# Patient Record
Sex: FEMALE | Race: WHITE | Employment: FULL TIME | URBAN - METROPOLITAN AREA
[De-identification: names, ages, dates, MRNs, and addresses within clinical notes are randomized per-mention and may not be internally consistent; named-entity substitution may affect disease eponyms.]

---

## 2023-01-08 ENCOUNTER — OFFICE VISIT (OUTPATIENT)
Dept: URGENT CARE | Facility: CLINIC | Age: 33
End: 2023-01-08

## 2023-01-08 VITALS
DIASTOLIC BLOOD PRESSURE: 78 MMHG | SYSTOLIC BLOOD PRESSURE: 144 MMHG | HEART RATE: 67 BPM | OXYGEN SATURATION: 99 % | RESPIRATION RATE: 16 BRPM | TEMPERATURE: 98.3 F

## 2023-01-08 DIAGNOSIS — J01.40 ACUTE PANSINUSITIS, RECURRENCE NOT SPECIFIED: Primary | ICD-10-CM

## 2023-01-08 RX ORDER — SUMATRIPTAN 50 MG/1
TABLET, FILM COATED ORAL EVERY 24 HOURS
COMMUNITY

## 2023-01-08 RX ORDER — CITALOPRAM 20 MG/1
20 TABLET ORAL DAILY
COMMUNITY

## 2023-01-08 RX ORDER — PROCHLORPERAZINE 25 MG
SUPPOSITORY, RECTAL RECTAL
COMMUNITY

## 2023-01-08 RX ORDER — BUSPIRONE HYDROCHLORIDE 15 MG/1
15 TABLET ORAL 2 TIMES DAILY
COMMUNITY
Start: 2023-01-03

## 2023-01-08 RX ORDER — EPINEPHRINE 0.3 MG/.3ML
INJECTION SUBCUTANEOUS
COMMUNITY

## 2023-01-08 RX ORDER — HYDROXYZINE HYDROCHLORIDE 10 MG/1
TABLET, FILM COATED ORAL
COMMUNITY
Start: 2022-12-05

## 2023-01-08 RX ORDER — INDOMETHACIN 50 MG/1
CAPSULE ORAL
COMMUNITY

## 2023-01-08 RX ORDER — VITAMIN E (DL,TOCOPHERYL ACET) 180 MG
CAPSULE ORAL EVERY 24 HOURS
COMMUNITY

## 2023-01-08 RX ORDER — AMOXICILLIN AND CLAVULANATE POTASSIUM 875; 125 MG/1; MG/1
1 TABLET, FILM COATED ORAL EVERY 12 HOURS SCHEDULED
Qty: 14 TABLET | Refills: 0 | Status: SHIPPED | OUTPATIENT
Start: 2023-01-08 | End: 2023-01-15

## 2023-01-09 NOTE — PATIENT INSTRUCTIONS
Sinusitis   AMBULATORY CARE:   Sinusitis  is inflammation or infection of your sinuses  Sinusitis is most often caused by a virus  Acute sinusitis may last up to 12 weeks  Chronic sinusitis lasts longer than 12 weeks  Recurrent sinusitis means you have 4 or more infections in 1 year  Common signs and symptoms:   Fever    Pain, pressure, redness, or swelling around the forehead, cheeks, or eyes    Thick yellow or green discharge from your nose    Tenderness when you touch your face over your sinuses    Dry cough that happens mostly at night or when you lie down    Headache and face pain that is worse when you lean forward    Tooth pain, or pain when you chew    Seek care immediately if:   You have trouble breathing or wheezing that is getting worse  You have a stiff neck, a fever, or a bad headache  You cannot open your eye  Your eyeball bulges out or you cannot move your eye  You are more sleepy than normal, or you notice changes in your ability to think, move, or talk  You have swelling of your forehead or scalp  Call your doctor if:   You have vision changes, such as double vision  Your eye and eyelid are red, swollen, and painful  Your symptoms do not improve or go away after 10 days  You have nausea and are vomiting  Your nose is bleeding  You have questions or concerns about your condition or care  Medicines: Your symptoms may go away on their own  Your healthcare provider may recommend watchful waiting for up to 10 days before starting antibiotics  You may need any of the following:  Acetaminophen  decreases pain and fever  It is available without a doctor's order  Ask how much to take and how often to take it  Follow directions  Read the labels of all other medicines you are using to see if they also contain acetaminophen, or ask your doctor or pharmacist  Acetaminophen can cause liver damage if not taken correctly   Do not use more than 4 grams (4,000 milligrams) total of acetaminophen in one day  NSAIDs , such as ibuprofen, help decrease swelling, pain, and fever  This medicine is available with or without a doctor's order  NSAIDs can cause stomach bleeding or kidney problems in certain people  If you take blood thinner medicine, always ask your healthcare provider if NSAIDs are safe for you  Always read the medicine label and follow directions  Nasal steroid sprays  may help decrease inflammation in your nose and sinuses  Decongestants  help reduce swelling and drain mucus in the nose and sinuses  They may help you breathe easier  Antihistamines  help dry mucus in the nose and relieve sneezing  Antibiotics  help treat or prevent a bacterial infection  Self-care:   Rinse your sinuses as directed  Use a sinus rinse device to rinse your nasal passages with a saline (salt water) solution or distilled water  Do not use tap water  This will help thin the mucus in your nose and rinse away pollen and dirt  It will also help reduce swelling so you can breathe normally  Use a humidifier  to increase air moisture in your home  This may make it easier for you to breathe and help decrease your cough  Sleep with your head elevated  Place an extra pillow under your head before you go to sleep to help your sinuses drain  Drink liquids as directed  Ask your healthcare provider how much liquid to drink each day and which liquids are best for you  Liquids will thin the mucus in your nose and help it drain  Avoid drinks that contain alcohol or caffeine  Do not smoke, and avoid secondhand smoke  Nicotine and other chemicals in cigarettes and cigars can make your symptoms worse  Ask your healthcare provider for information if you currently smoke and need help to quit  E-cigarettes or smokeless tobacco still contain nicotine  Talk to your healthcare provider before you use these products      Prevent the spread of germs:   Wash your hands often with soap and water  Wash your hands after you use the bathroom, change a child's diaper, or sneeze  Wash your hands before you prepare or eat food  Stay away from people who are sick  Some germs spread easily and quickly through contact  Follow up with your doctor as directed: You may be referred to an ear, nose, and throat specialist  Write down your questions so you remember to ask them during your visits  © Copyright [employer] 2022 Information is for End User's use only and may not be sold, redistributed or otherwise used for commercial purposes  All illustrations and images included in CareNotes® are the copyrighted property of A D A M , Inc  or [facility] Saavedra   The above information is an  only  It is not intended as medical advice for individual conditions or treatments  Talk to your doctor, nurse or pharmacist before following any medical regimen to see if it is safe and effective for you

## 2023-02-24 NOTE — PROGRESS NOTES
[facility]        NAME: Keshawn Hyman is a 27 y o  female  : 1990    MRN: [id number]  DATE: 2023  TIME: 3:03 PM    Assessment and Plan   Acute pansinusitis, recurrence not specified [J01 40]  1  Acute pansinusitis, recurrence not specified  amoxicillin-clavulanate (AUGMENTIN) 875-125 mg per tablet            Patient Instructions       Follow up with PCP in 3-5 days  Proceed to  ER if symptoms worsen  Chief Complaint     Chief Complaint   Patient presents with   • Cold Like Symptoms     1 week:  Cough, sore throat, HA, post nasal drip, nasal congestion, sinus pressure, bilateral ear pain  Denies fever, chills, n/v/d  Pt covid tested several times at home  (negative)  Pt taking mucinex and theraflu otc  History of Present Illness       28 yo F presents with Cough, sore throat, HA, post nasal drip, nasal congestion, sinus pressure, bilateral ear pain  Denies fever, chills, n/v/d  Pt covid tested several times at home  (negative)  Pt taking mucinex and theraflu otc  Symptoms started 1 week ago  Review of Systems   Review of Systems   Constitutional: Negative for chills and fever  HENT: Positive for congestion, ear pain, sinus pressure and sore throat  Negative for rhinorrhea and sinus pain  Respiratory: Positive for cough  Gastrointestinal: Negative for abdominal pain, diarrhea, nausea and vomiting  Musculoskeletal: Negative for myalgias  Neurological: Positive for headaches  Negative for dizziness and light-headedness           Current Medications       Current Outpatient Medications:   •  busPIRone (BUSPAR) 15 mg tablet, Take 15 mg by mouth 2 (two) times a day, Disp: , Rfl:   •  Cholecalciferol 50 MCG ( UT) TABS, 1 capsule every 24 hours, Disp: , Rfl:   •  citalopram (CeleXA) 20 mg tablet, Take 20 mg by mouth daily, Disp: , Rfl:   •  EPINEPHrine (EpiPen 2-Binu) 0 3 mg/0 3 mL SOAJ, as directed Injection as directed, Disp: , Rfl:   •  hydrOXYzine HCL (ATARAX) 10 mg tablet, TAKE 1/4 TABLET 1-4 TIMES A DAY, Disp: , Rfl:   •  indomethacin (INDOCIN) 50 mg capsule, TAKE 1 CAPSULE EVERY DAY AS NEEDED WITH FOOD OR MILK for 30, Disp: , Rfl:   •  Magnesium Oxide 500 MG CAPS, every 24 hours, Disp: , Rfl:   •  Multiple Vitamin (MULTI-VITAMIN DAILY PO), Take by mouth daily, Disp: , Rfl:   •  Probiotic Product (PROBIOTIC-10 PO), every 24 hours, Disp: , Rfl:   •  prochlorperazine (COMPAZINE) 25 mg suppository, 1 suppository as needed Rectal Twice a day prn nausea/vomiting, Disp: , Rfl:   •  SUMAtriptan (IMITREX) 50 mg tablet, every 24 hours, Disp: , Rfl:     Current Allergies     Allergies as of 01/08/2023 - Reviewed 01/08/2023   Allergen Reaction Noted   • 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   • Camphor Other (See Comments) 12/23/2015   • Mirabegron Hypertension 01/08/2023   • Doxycycline Rash 12/23/2015   • Latex Rash 12/23/2015            The following portions of the patient's history were reviewed and updated as appropriate: allergies, current medications, past family history, past medical history, past social history, past surgical history and problem list      History reviewed  No pertinent past medical history  History reviewed  No pertinent surgical history  History reviewed  No pertinent family history  Medications have been verified  Objective   /78   Pulse 67   Temp 98 3 °F (36 8 °C)   Resp 16   SpO2 99%   No LMP recorded  Physical Exam     Physical Exam  Vitals and nursing note reviewed  Constitutional:       General: She is not in acute distress  Appearance: She is well-developed  She is not diaphoretic  HENT:      Head: Normocephalic and atraumatic  Right Ear: Tympanic membrane normal       Left Ear: Tympanic membrane normal       Nose: Mucosal edema, congestion and rhinorrhea present  Right Sinus: Maxillary sinus tenderness and frontal sinus tenderness present        Left Sinus: Maxillary sinus tenderness and frontal sinus tenderness present  Mouth/Throat:      Pharynx: Uvula midline  Posterior oropharyngeal erythema (PND) present  Cardiovascular:      Rate and Rhythm: Normal rate and regular rhythm  Pulmonary:      Effort: Pulmonary effort is normal       Breath sounds: Normal breath sounds  Musculoskeletal:         General: Normal range of motion  Skin:     General: Skin is warm and dry  Findings: No rash  Neurological:      Mental Status: She is alert and oriented to person, place, and time

## 2023-05-05 ENCOUNTER — OFFICE VISIT (OUTPATIENT)
Dept: PHYSICAL THERAPY | Facility: CLINIC | Age: 33
End: 2023-05-05

## 2023-05-05 DIAGNOSIS — M76.71 PERONEAL TENDONITIS OF RIGHT LOWER LEG: Primary | ICD-10-CM

## 2023-05-05 NOTE — PROGRESS NOTES
PT Evaluation     Today's date: 2023  Patient name: Tania Marc  : 1990  MRN: [id number]  Referring provider: Yolanda Mulligan PT  Dx:   Encounter Diagnosis     ICD-10-CM    1  Peroneal tendonitis of right lower leg  M76 71                      Assessment  Assessment details: Tania Marc is a 27 y o  female presenting to outpatient physical therapy at [place] [address] with complaints of R medial ankle and foot pain  She presents with decreased intrinsic foot strength, limited STM, altered running mechanics, poor balance, decreased tolerance to activity and decreased functional mobility due to R ankle posterior tibialis tendonitis  She would benefit from skilled PT services in order to address these deficits and reach maximum level of function  She will benefit form a running analysis  She presented to me via direct access  Impairments: abnormal gait, abnormal or restricted ROM, activity intolerance, impaired balance, impaired physical strength, lacks appropriate home exercise program and pain with function  Barriers to therapy: None  Understanding of Dx/Px/POC: excellent  Goals  ST  Independent with HEP in 2 weeks  2  No tightness R posterior tib in 3 weeks     LT  Achieve FOTO score of 72/100 in 4 weeks   2  Able to run x 3 miles without LE pain in 4 weeks  3  Strength B foot intrinsics = 5/5 in 4 weeks  4    Good running mechanics in 4 weeks    Plan  Patient would benefit from: skilled PT and PT eval  Planned modality interventions: cryotherapy  Other planned modality interventions: laser  Planned therapy interventions: ADL retraining, balance/weight bearing training, flexibility, functional ROM exercises, home exercise program, joint mobilization, manual therapy, neuromuscular re-education, strengthening, stretching, therapeutic activities, therapeutic exercise and gait training  Frequency: 2x week  Duration in weeks: 4  Plan of Care beginning date: 2023  Plan of Care expiration date: 2023  Treatment plan discussed with: patient        Subjective Evaluation    History of Present Illness  Mechanism of injury: Pt reports having R>L ankle pain since about 1 year ago  Had PT with min pain relief  Working with a running  - strength training 3x/wk  Running 3x/wk about 20 miles/week  Has been running since 15 years mostly road  Wants to run a 50 mile race in 2023  Has completed 3 marathons  She is a runner and is having difficulty running due to her pain  Has not run in the past week due to pain  Uses orthotics in B shoes  Working full time as a dietician  Recurrent probem    Quality of life: excellent    Pain  Current pain ratin  At best pain ratin  At worst pain ratin  Quality: sharp  Relieving factors: rest  Aggravating factors: running  Progression: worsening    Social Support  Lives with: spouse    Employment status: working    Diagnostic Tests  No diagnostic tests performed  Treatments  Previous treatment: physical therapy  Patient Goals  Patient goals for therapy: decreased pain, improved balance, increased strength and return to sport/leisure activities          Objective     Observations   Left Ankle/Foot   Negative for effusion  Right Ankle/Foot   Negative for effusion  Additional Observation Details  Mod pes planus B  Palpation   Left   No palpable tenderness to the posterior tibialis  Right   Hypertonic in the posterior tibialis  Tenderness of the posterior tibialis  Tenderness     Right Ankle/Foot   Tenderness in the posterior tibial tendon  No tenderness in the peroneal tendon and plantar fascia       Neurological Testing     Sensation     Ankle/Foot   Left Ankle/Foot   Intact: light touch    Right Ankle/Foot   Intact: light touch     Active Range of Motion   Left Ankle/Foot   Normal active range of motion    Right Ankle/Foot   Normal active range of motion    Strength/Myotome Testing     Left Ankle/Foot   Normal strength    Right Ankle/Foot   Normal strength    Additional Strength Details  Foot intrinsics = 4-/5 B    Ambulation     Observational Gait   Gait: within functional limits     Comments   Fair B LE balance         Flowsheet Rows    Flowsheet Row Most Recent Value   PT/OT G-Codes    Current Score 65   Projected Score 78        POC EXPIRES On:  6/2/23  PRECAUTIONS:  None  CO-MORBIDITES:  None  PERSONAL FACTORS:  None      Manuals HEP 5/5           STM R post tib  10'                                                  Neuro Re-Ed                                                                                                Ther Ex    Toe flexion 5/5 20           Toe spreading 5/5 20                                                                                         Ther Activity                              Gait Training                              Modalities

## 2023-05-12 ENCOUNTER — OFFICE VISIT (OUTPATIENT)
Dept: PHYSICAL THERAPY | Facility: CLINIC | Age: 33
End: 2023-05-12

## 2023-05-12 DIAGNOSIS — M76.71 PERONEAL TENDONITIS OF RIGHT LOWER LEG: Primary | ICD-10-CM

## 2023-05-12 NOTE — PROGRESS NOTES
Daily Note     Today's date: 2023  Patient name: Simeon Duran  : 1990  MRN: [id number]  Referring provider: Ricki Good, PT  Dx:   Encounter Diagnosis     ICD-10-CM    1  Peroneal tendonitis of right lower leg  M76 71                  Subjective:  Pt reports feeling pretty good  Able to run up to 4 miles and feeling good when running but min soreness after  Objective:  See treatment diary below      Assessment:  Pt presented to outpatient physical therapy at [place] [address] with complaints of R medial ankle and foot pain  She presented with decreased intrinsic foot strength, limited STM, altered running mechanics, poor balance, decreased tolerance to activity and decreased functional mobility due to R ankle posterior tibialis tendonitis  Much less R ankle pain today, but min L medial ankle pain as well as she has increased her running mileage  Able to run up to 4 miles now  She should be able to run 5 miles on 23 with 2 4 mile runs next week with up to 10k run on 23 if all progresses well  She will continue to benefit from skilled PT services in order to address these deficits and reach maximum level of function  She will benefit form a running analysis  She presented to me via direct access  Plan:  Running eval next session  Last scheduled visit on 23        POC EXPIRES On:  23  PRECAUTIONS:  None  CO-MORBIDITES:  None  PERSONAL FACTORS:  None      Manuals HEP           STM L/R post tib  10' 8'          Laser L/R post tib   5'                                    Neuro Re-Ed     Bosu mini squats dome down   20          Bosu lunges dome up FWD + Side L/R   20 ea          TB kicks with L for R LE balance hip flex, ext, abd   Green 20 ea          SLS R on bkack disc with fingertip cone touch FWD, L/R side   10 ea                                                 Ther Ex    Toe flexion  20 20          Toe spreading  20 20          Elliptical   L1 5' Ther Activity                              Gait Training                              Modalities

## 2023-05-19 ENCOUNTER — OFFICE VISIT (OUTPATIENT)
Dept: PHYSICAL THERAPY | Facility: CLINIC | Age: 33
End: 2023-05-19

## 2023-05-19 DIAGNOSIS — M76.71 PERONEAL TENDONITIS OF RIGHT LOWER LEG: Primary | ICD-10-CM

## 2023-05-19 NOTE — PROGRESS NOTES
Daily Note     Today's date: 2023  Patient name: Bridgette Hankins  : 1990  MRN: [id number]  Referring provider: Sky Curry, PT  Dx:   Encounter Diagnosis     ICD-10-CM    1  Peroneal tendonitis of right lower leg  M76 71                  Subjective:  Pt reports feeling good aside from some L lateral ankle soreness after rolling her ankle 2 days ago  Not bad but not sure a running analysis would be good today  Able to run 5 miles without pain now  Objective:  See treatment diary below      Assessment:  Pt presented to outpatient physical therapy at [facility] with complaints of R medial ankle and foot pain  She presented with decreased intrinsic foot strength, limited STM, altered running mechanics, poor balance, decreased tolerance to activity and decreased functional mobility due to R ankle posterior tibialis tendonitis  Much less R ankle pain now, but min L lateral ankle pain after sustaining an inversion sprain 2 days ago  Able to run up to 5 miles now  She will continue to benefit from skilled PT services in order to address these deficits and reach maximum level of function  She will benefit form a running analysis  She presented to me via direct access  Plan:  Running eval next session as L lateral ankle soreness lessens          POC EXPIRES On:  23  PRECAUTIONS:  None  CO-MORBIDITES:  None  PERSONAL FACTORS:  None      Manuals HEP          STM L/R post tib  10' 8' 8'         Laser L/R post tib   5' 5'                                   Neuro Re-Ed     Bosu mini squats dome down   20 20         Bosu lunges dome up FWD + Side L/R   20 ea 20 ea         TB kicks with L for R LE balance hip flex, ext, abd   Green 20 ea Green 20 ea L+R         SLS R on bkack disc with fingertip cone touch FWD, L/R side   10 ea 10 ea                                                Ther Ex    Toe flexion 5/5 20 20 20         Toe spreading 5/ 20 20 20         Elliptical   L1 5' L1 5' Ther Activity                              Gait Training                              Modalities

## 2023-06-07 ENCOUNTER — OFFICE VISIT (OUTPATIENT)
Dept: PHYSICAL THERAPY | Facility: CLINIC | Age: 33
End: 2023-06-07
Payer: COMMERCIAL

## 2023-06-07 DIAGNOSIS — M76.71 PERONEAL TENDONITIS OF RIGHT LOWER LEG: Primary | ICD-10-CM

## 2023-06-07 PROCEDURE — 97530 THERAPEUTIC ACTIVITIES: CPT | Performed by: PHYSICAL THERAPIST

## 2023-06-07 PROCEDURE — 97140 MANUAL THERAPY 1/> REGIONS: CPT | Performed by: PHYSICAL THERAPIST

## 2023-06-07 NOTE — PROGRESS NOTES
PT Re-evaluation + D/C    Today's date: 2023  Patient name: Jovita Drake  : 1990  MRN: [id number]  Referring provider: Ira Mckenzie PT  Dx:   Encounter Diagnosis     ICD-10-CM    1  Peroneal tendonitis of right lower leg  M76 71                      Assessment  Assessment details: Jovita Drake is a 27 y o  female who presented to outpatient physical therapy at [place] [address] with complaints of R medial ankle and foot pain  She presented with decreased intrinsic foot strength, limited STM, altered running mechanics, poor balance, decreased tolerance to activity and decreased functional mobility due to R ankle posterior tibialis tendonitis  Her progression has bene good in PT  She will continue to benefit from skilled PT services in order to address these deficits and reach maximum level of function  Video analysis today showed very good form aside from min slowed ashli  She was advised to try to pace count about 1x every mile for 30 sec to try to get her step rate up to 180/min  She was able to achieve this on the t-mill today after cueing  No significant pain or tenderness remaining in LE's  She presented to me initially via direct access  She is ready for D/C to HEP at this time  Impairments: abnormal gait, abnormal or restricted ROM, activity intolerance, impaired physical strength and pain with function  Barriers to therapy: None  Understanding of Dx/Px/POC: excellent  Goals  ST  Independent with HEP in 2 weeks - Met  2  No tightness R posterior tib in 3 weeks - Met     LT  Achieve FOTO score of 72/100 in 4 weeks - Met  2  Able to run x 3 miles without LE pain in 4 weeks - Met  3  Strength B foot intrinsics = 5/5 in 4 weeks - Met  4    Good running mechanics in 4 weeks - Met    Plan  Patient would benefit from: skilled PT  Planned modality interventions: cryotherapy  Other planned modality interventions: laser  Planned therapy interventions: ADL retraining, balance/weight bearing training, flexibility, functional ROM exercises, home exercise program, joint mobilization, manual therapy, neuromuscular re-education, strengthening, stretching, therapeutic activities, therapeutic exercise and gait training  Frequency: 1x week  Duration in weeks: 1  Treatment plan discussed with: patient        Subjective Evaluation    History of Present Illness  Mechanism of injury: Pt reports having R>L ankle pain since about 1 year ago  Had PT with min pain relief  Working with a running  - strength training 3x/wk  Running 3x/wk about 20 miles/week  Has been running since 15 years mostly road  Was able to run 7 miles on 23 without any pain  Wants to run a 50 mile race in 2023  Has completed 3 marathons  She is a runner and was having difficulty running due to her pain, but less recently since starting PT  Uses orthotics in B shoes  Working full time as a dietician  Recurrent probem    Quality of life: excellent    Pain  Current pain ratin  At best pain ratin  At worst pain ratin  Quality: dull ache  Relieving factors: rest  Aggravating factors: running  Progression: improved    Social Support  Lives with: spouse    Employment status: working    Diagnostic Tests  No diagnostic tests performed  Treatments  Previous treatment: physical therapy  Current treatment: chiropractic and physical therapy  Patient Goals  Patient goals for therapy: decreased pain, increased strength and return to sport/leisure activities          Objective     Observations   Left Ankle/Foot   Negative for effusion  Right Ankle/Foot   Negative for effusion  Additional Observation Details  Min pes planus B  Palpation   Left   No palpable tenderness to the posterior tibialis  Right   No palpable tenderness to the posterior tibialis  Tenderness     Right Ankle/Foot   No tenderness in the peroneal tendon, plantar fascia and posterior tibial tendon       Neurological Testing Sensation     Ankle/Foot   Left Ankle/Foot   Intact: light touch    Right Ankle/Foot   Intact: light touch     Active Range of Motion   Left Ankle/Foot   Normal active range of motion    Right Ankle/Foot   Normal active range of motion    Strength/Myotome Testing     Left Ankle/Foot   Normal strength    Right Ankle/Foot   Normal strength    Additional Strength Details  Foot intrinsics = 4+/5 B    Ambulation     Observational Gait   Gait: within functional limits     Comments   Excellent B LE balance            POC EXPIRES On:  6/7/23  PRECAUTIONS:  None  CO-MORBIDITES:  None  PERSONAL FACTORS:  None      Manuals HEP 5/5 5/12 5/19 6/7        STM L/R post tib  10' 8' 8'         Laser L/R post tib   5' 5' 8'                                  Neuro Re-Ed     Bosu mini squats dome down   20 20         Bosu lunges dome up FWD + Side L/R   20 ea 20 ea         TB kicks with L for R LE balance hip flex, ext, abd   Green 20 ea Green 20 ea L+R         SLS R on bkack disc with fingertip cone touch FWD, L/R side   10 ea 10 ea                                                Ther Ex    Toe flexion 5/5 20 20 20         Toe spreading 5/5 20 20 20         Elliptical   L1 5' L1 5'                                                                          Ther Activity    Running analysis     23'                     Gait Training                              Modalities

## 2023-06-09 ENCOUNTER — APPOINTMENT (OUTPATIENT)
Dept: PHYSICAL THERAPY | Facility: CLINIC | Age: 33
End: 2023-06-09
Payer: COMMERCIAL

## 2025-07-25 ENCOUNTER — OFFICE VISIT (OUTPATIENT)
Dept: PODIATRY | Facility: CLINIC | Age: 35
End: 2025-07-25
Payer: COMMERCIAL

## 2025-07-25 DIAGNOSIS — L60.0 INGROWN TOENAIL: Primary | ICD-10-CM

## 2025-07-25 DIAGNOSIS — M79.674 PAIN OF RIGHT GREAT TOE: ICD-10-CM

## 2025-07-25 PROCEDURE — 99203 OFFICE O/P NEW LOW 30 MIN: CPT | Performed by: PODIATRIST

## 2025-07-25 PROCEDURE — 11750 EXCISION NAIL&NAIL MATRIX: CPT | Performed by: PODIATRIST

## 2025-07-25 NOTE — PROGRESS NOTES
"Name: Sophia Elizabeth      : 1990      MRN: [id number]  Encounter Provider: Mayo Paris DPM  Encounter Date: 2025   Encounter department: [facility] [place]  :  Assessment & Plan  Ingrown toenail         Pain of right great toe       Examined the left hallux nail which was surgically removed approximately 4 days ago.  She is to follow the same postop course with this nail as she does with the nail that was done today.  Nail removal    Date/Time: 2025 9:00 AM    Performed by: Mayo Paris DPM  Authorized by: Mayo Paris DPM    Patient location:  Clinic  Indications / Diagnosis:  Ingrown nail    Universal Protocol:  procedure performed by consultantConsent: Verbal consent obtained  Risks and benefits: risks, benefits and alternatives were discussed  Consent given by: patient  Time out: Immediately prior to procedure a \"time out\" was called to verify the correct patient, procedure, equipment, support staff and site/side marked as required.  Patient understanding: patient states understanding of the procedure being performed  Patient consent: the patient's understanding of the procedure matches consent given  Patient identity confirmed: verbally with patient    Location:     Foot:  R big toe  Pre-procedure details:     Skin preparation:  Betadine    Preparation: Patient was prepped and draped in the usual sterile fashion    Anesthesia (see MAR for exact dosages):     Anesthesia method:  Nerve block    Block needle gauge:  25 G    Block anesthetic:  Lidocaine 2% w/o epi    Block technique:  Digital Block    Block outcome:  Anesthesia achieved  Nail Removal:     Nail removed:  Partial    Nail removed location: medial and lateral borders.    Nail bed sutured: no    Ingrown nail:     Wedge excision of skin: no      Nail matrix removed or ablated:  Partial  Post-procedure details:     Dressing:  4x4 sterile gauze, antibiotic ointment and gauze roll    Patient tolerance of " procedure:  Tolerated well, no immediate complications  Comments:      Discussion with patient was completed today regarding diagnosis and potential etiologies as well as treatment options for this ingrown nail diagnosis.  Discussed how to avoid recurrence and possible treatment options if recurrence does occur.    Antibiotic ointment applied to border with bandage dressing  Pt instructed to keep dressing intact for 24 hours.  After this time use triple antibiotic ointment to the area and a dry dressing changed daily  Return to clinic in about 3 weeks for reevaluation.  If notice any redness, swelling, drainage, or excessive pain or signs of infection to notify the office sooner.  Procedure completed without incident.  Do not soak foot.    Procedure in detail: Once the toe was anesthetized, the area was scrubbed and prepped with sterile technique.  A Tourni-Cot was used to the level on the toe.  A hemostat was used to lift up the involved border of the great toe.  Care was taken to protect the underlying nail bed.  An English anvil was used to cut back corner to the base of the nail.  A hemostat was then used to remove the nail that was ingrown.  This was then checked that the entire ingrown portion was removed. A currette was used to remove the nail matrix from the base of the nail at the proximal corner, three applications of 90% phenol were applied to the border with cotton swabs with alcohol wash inbetween.   Hemostasis was achieved and dressings were applied.      She could return to running in approximately 4 days but can do lower body such as a bike comes Sunday.  She is to take it easy today.  She is to continue taking the antibiotics that were prescribed to her by the other podiatrist until they are finished.  She is to take over-the-counter NSAIDs for pain as needed.     Return in about 3 weeks (around 8/15/2025).     History of Present Illness   HPI  Sophia Elizabeth is a 34 y.o. female who presents with chief  complaint of a reoccurring ingrown nail both borders of her right hallux nail.  Patient wants the corners removed permanently because she has training coming up for an ultramarathon that is in the latter part of September.  She recently had the left hallux nail totally avulsed approximately 3 days ago by another physician.  History obtained from: patient    Review of Systems  Medical History Reviewed by provider this encounter:     .  Medications Ordered Prior to Encounter[1]   Social History[2]     Objective   There were no vitals taken for this visit.     Physical Exam  Vascular status is 2/4 DP PT negative digital hair, normal distal cooling, immediate capillary refill bilaterally.  Capillary refill is approximately 2 seconds.    Derm the right hallux nail is incurvated on both borders with erythema and pain upon palpation.  There is hypertrophic tissue present on both borders with the medial being worse than the lateral.  No signs of any erythema along the borders and no signs of any infection.  The left hallux nail has fibrotic tissue present on the surface and no signs of any infection.  No drainage is noted the nailbed is intact on the left extremity.  Small amount of erythema is located on the medial and lateral borders secondary to the recent surgery.  Nailbed is sensitive to touch.    Neuro light touch is intact and equal bilaterally.         [1]   Current Outpatient Medications on File Prior to Visit   Medication Sig Dispense Refill    busPIRone (BUSPAR) 15 mg tablet Take 15 mg by mouth 2 (two) times a day      Cholecalciferol 50 MCG (2000 UT) TABS 1 capsule every 24 hours      citalopram (CeleXA) 20 mg tablet Take 20 mg by mouth daily      EPINEPHrine (EpiPen 2-Binu) 0.3 mg/0.3 mL SOAJ as directed Injection as directed      hydrOXYzine HCL (ATARAX) 10 mg tablet TAKE 1/4 TABLET 1-4 TIMES A DAY      indomethacin (INDOCIN) 50 mg capsule TAKE 1 CAPSULE EVERY DAY AS NEEDED WITH FOOD OR MILK for 30       Magnesium Oxide 500 MG CAPS every 24 hours      Multiple Vitamin (MULTI-VITAMIN DAILY PO) Take by mouth daily      Probiotic Product (PROBIOTIC-10 PO) every 24 hours      prochlorperazine (COMPAZINE) 25 mg suppository 1 suppository as needed Rectal Twice a day prn nausea/vomiting      SUMAtriptan (IMITREX) 50 mg tablet every 24 hours       No current facility-administered medications on file prior to visit.   [2]

## 2025-08-07 ENCOUNTER — OB ABSTRACT (OUTPATIENT)
Age: 35
End: 2025-08-07

## 2025-08-11 ENCOUNTER — OFFICE VISIT (OUTPATIENT)
Age: 35
End: 2025-08-11
Payer: COMMERCIAL

## 2025-08-22 ENCOUNTER — PROCEDURE VISIT (OUTPATIENT)
Dept: PODIATRY | Facility: CLINIC | Age: 35
End: 2025-08-22
Payer: COMMERCIAL

## 2025-08-22 DIAGNOSIS — M79.675 PAIN IN TOES OF BOTH FEET: ICD-10-CM

## 2025-08-22 DIAGNOSIS — M79.674 PAIN IN TOES OF BOTH FEET: ICD-10-CM

## 2025-08-22 DIAGNOSIS — L60.0 INGROWN TOENAIL: Primary | ICD-10-CM

## 2025-08-22 DIAGNOSIS — B35.1 ONYCHOMYCOSIS: ICD-10-CM

## 2025-08-22 PROCEDURE — 99212 OFFICE O/P EST SF 10 MIN: CPT | Performed by: PODIATRIST
